# Patient Record
Sex: MALE | Race: WHITE | NOT HISPANIC OR LATINO
[De-identification: names, ages, dates, MRNs, and addresses within clinical notes are randomized per-mention and may not be internally consistent; named-entity substitution may affect disease eponyms.]

---

## 2021-04-07 ENCOUNTER — TRANSCRIPTION ENCOUNTER (OUTPATIENT)
Age: 71
End: 2021-04-07

## 2022-07-18 ENCOUNTER — APPOINTMENT (OUTPATIENT)
Dept: ORTHOPEDIC SURGERY | Facility: CLINIC | Age: 72
End: 2022-07-18

## 2022-07-18 DIAGNOSIS — Z00.00 ENCOUNTER FOR GENERAL ADULT MEDICAL EXAMINATION W/OUT ABNORMAL FINDINGS: ICD-10-CM

## 2022-07-18 DIAGNOSIS — M20.11 HALLUX VALGUS (ACQUIRED), RIGHT FOOT: ICD-10-CM

## 2022-07-18 DIAGNOSIS — M20.21 HALLUX RIGIDUS, RIGHT FOOT: ICD-10-CM

## 2022-07-18 PROCEDURE — 73620 X-RAY EXAM OF FOOT: CPT | Mod: RT

## 2022-07-18 PROCEDURE — J3490M: CUSTOM

## 2022-07-18 PROCEDURE — 99204 OFFICE O/P NEW MOD 45 MIN: CPT | Mod: 25

## 2022-07-18 PROCEDURE — 76942 ECHO GUIDE FOR BIOPSY: CPT | Mod: RT

## 2022-07-18 PROCEDURE — 20604 DRAIN/INJ JOINT/BURSA W/US: CPT | Mod: RT

## 2022-07-18 NOTE — PHYSICAL EXAM
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  [TWNoteComboBox6] : MTP joint PF 10 degrees

## 2022-07-18 NOTE — ASSESSMENT
[FreeTextEntry1] : Discussed treatment options with patient, both operative and non-operative. NSAIDS (topical vs. oral), shoe modifications, activity modification, orthotics with Mix's extension, steroid injection and surgery discussed as options. Recommend stiff soled shoe. Ice to affected area is recommended.\par \par Pt. will modify his footwear and try voltaren gel. He would also like to try a steroid injection today. \par \par

## 2022-07-18 NOTE — PROCEDURE
[FreeTextEntry3] : Patient has tried OTC's including aspirin, Ibuprofen, Aleve, etc or prescription NSAIDS, and/or exercises at home and/or physical therapy without satisfactory response and the risks benefits, and alternatives have been discussed, and verbal consent was obtained. \par \par The risks, benefits and contents of the injection have been discussed.  Risks include but are not limited to allergic reaction, flare reaction, permanent white skin discoloration at the injection site and infection.  The patient understands the risks and agrees to having the injection.  All questions have been answered.\par \par An injection of the right 1st TMT joint was performed. The indication for this procedure was pain and inflammation. The site was prepped with alcohol and sterile technique used. An injection of Lidocaine 1cc of 1% , Bupivacaine (Marcaine) 1cc of 0.5% , Methylprednisolone (Depomedrol) 1cc of 80 mg was used. Patient tolerated procedure well. Patient was advised to call if redness, pain or fever occur, apply ice for 15 minutes out of every hour for the next 12-24 hours as tolerated and patient was advised to rest the joint(s) for 3 days. \par \par Ultrasound guidance was indicated for this patient due to arthritis . All ultrasound images have been permanently captured and stored accordingly in our picture archiving and communication system. Visualization of the needle and placement of injection was performed without complication.\par

## 2022-07-18 NOTE — HISTORY OF PRESENT ILLNESS
[de-identified] : Pt. is a 72 year old male who presents for evaluation of his RT foot. Denies trauma. Symptoms x years. WB without assistive device. He tried orthotics years ago without relief. No recent treatment. No previous injury/problem with right foot.  [] : Post Surgical Visit: no [FreeTextEntry1] : L foot